# Patient Record
Sex: MALE | Race: BLACK OR AFRICAN AMERICAN | HISPANIC OR LATINO
[De-identification: names, ages, dates, MRNs, and addresses within clinical notes are randomized per-mention and may not be internally consistent; named-entity substitution may affect disease eponyms.]

---

## 2019-03-14 PROBLEM — Z00.00 ENCOUNTER FOR PREVENTIVE HEALTH EXAMINATION: Status: ACTIVE | Noted: 2019-03-14

## 2019-03-25 ENCOUNTER — APPOINTMENT (OUTPATIENT)
Dept: NEUROLOGY | Facility: CLINIC | Age: 79
End: 2019-03-25
Payer: MEDICARE

## 2019-03-25 VITALS
HEIGHT: 71 IN | TEMPERATURE: 97.9 F | HEART RATE: 56 BPM | BODY MASS INDEX: 23.94 KG/M2 | OXYGEN SATURATION: 99 % | SYSTOLIC BLOOD PRESSURE: 133 MMHG | WEIGHT: 171 LBS | DIASTOLIC BLOOD PRESSURE: 83 MMHG

## 2019-03-25 DIAGNOSIS — Z81.8 FAMILY HISTORY OF OTHER MENTAL AND BEHAVIORAL DISORDERS: ICD-10-CM

## 2019-03-25 DIAGNOSIS — R41.3 OTHER AMNESIA: ICD-10-CM

## 2019-03-25 DIAGNOSIS — Z82.3 FAMILY HISTORY OF STROKE: ICD-10-CM

## 2019-03-25 DIAGNOSIS — Z82.0 FAMILY HISTORY OF EPILEPSY AND OTHER DISEASES OF THE NERVOUS SYSTEM: ICD-10-CM

## 2019-03-25 PROCEDURE — 99204 OFFICE O/P NEW MOD 45 MIN: CPT

## 2019-03-25 NOTE — HISTORY OF PRESENT ILLNESS
[FreeTextEntry1] : Pt    presented   accompanied    by his  daughter.   Pt    was    noticed    to  have     disorganized  behavior,   squandering  money,   misplacing    the    things.    Cognitive    issues    were    noticed  a   few    year s  back,  however     getting  progressively   worse.   Pt  lives  by   himself  in  ,  however     based on the   daughters   description his    living   condition is     poor.  He    is  not    able   to  clean    his  house, or   prepare     his   meals.    It  appears     that    he is  not      compliant     with   his  meds.

## 2019-03-25 NOTE — PHYSICAL EXAM
[General Appearance - Alert] : alert [General Appearance - In No Acute Distress] : in no acute distress [General Appearance - Well Developed] : well developed [Oriented To Time, Place, And Person] : oriented to person, place, and time [I: Normal Smell] : smell intact bilaterally [Cranial Nerves Optic (II)] : visual acuity intact bilaterally,  visual fields full to confrontation, pupils equal round and reactive to light [Cranial Nerves Oculomotor (III)] : extraocular motion intact [Cranial Nerves Trigeminal (V)] : facial sensation intact symmetrically [Cranial Nerves Facial (VII)] : face symmetrical [Cranial Nerves Glossopharyngeal (IX)] : tongue and palate midline [Cranial Nerves Accessory (XI - Cranial And Spinal)] : head turning and shoulder shrug symmetric [Cranial Nerves Hypoglossal (XII)] : there was no tongue deviation with protrusion [Motor Tone] : muscle tone was normal in all four extremities [Motor Strength] : muscle strength was normal in all four extremities [Involuntary Movements] : no involuntary movements were seen [Sclera] : the sclera and conjunctiva were normal [Outer Ear] : the ears and nose were normal in appearance [FreeTextEntry1] : Pt    appears   to    be    unaware    of  the     money   squandering  and   his     cognitive    issues [Short Term Intact] : short term memory impaired [Remote Intact] : remote memory impaired [FreeTextEntry6] : 5/5 [FreeTextEntry7] : sensory    is   intact

## 2019-03-25 NOTE — DISCUSSION/SUMMARY
[FreeTextEntry1] : There    is   a    significant    family  h/o   AD\par He    will  need    further    testing\par safety    issues    were    discussed    with    the   patient's    daughter\par

## 2019-03-26 ENCOUNTER — OTHER (OUTPATIENT)
Age: 79
End: 2019-03-26

## 2019-03-27 ENCOUNTER — OTHER (OUTPATIENT)
Age: 79
End: 2019-03-27

## 2019-03-27 LAB
FOLATE SERPL-MCNC: >20 NG/ML
TSH SERPL-ACNC: 6.55 UIU/ML
VIT B12 SERPL-MCNC: 847 PG/ML

## 2019-04-12 ENCOUNTER — APPOINTMENT (OUTPATIENT)
Dept: NEUROLOGY | Facility: CLINIC | Age: 79
End: 2019-04-12
Payer: MEDICARE

## 2019-04-12 ENCOUNTER — APPOINTMENT (OUTPATIENT)
Dept: NEUROLOGY | Facility: CLINIC | Age: 79
End: 2019-04-12

## 2019-04-12 ENCOUNTER — APPOINTMENT (OUTPATIENT)
Dept: MRI IMAGING | Facility: HOSPITAL | Age: 79
End: 2019-04-12
Payer: MEDICARE

## 2019-04-12 ENCOUNTER — OUTPATIENT (OUTPATIENT)
Dept: OUTPATIENT SERVICES | Facility: HOSPITAL | Age: 79
LOS: 1 days | End: 2019-04-12
Payer: MEDICARE

## 2019-04-12 PROCEDURE — 70551 MRI BRAIN STEM W/O DYE: CPT

## 2019-04-12 PROCEDURE — 70551 MRI BRAIN STEM W/O DYE: CPT | Mod: 26

## 2019-04-12 PROCEDURE — 95819 EEG AWAKE AND ASLEEP: CPT

## 2019-05-10 ENCOUNTER — LABORATORY RESULT (OUTPATIENT)
Age: 79
End: 2019-05-10

## 2019-05-10 ENCOUNTER — APPOINTMENT (OUTPATIENT)
Dept: NEUROLOGY | Facility: CLINIC | Age: 79
End: 2019-05-10
Payer: MEDICARE

## 2019-05-10 VITALS
HEART RATE: 60 BPM | HEIGHT: 71 IN | SYSTOLIC BLOOD PRESSURE: 133 MMHG | OXYGEN SATURATION: 99 % | DIASTOLIC BLOOD PRESSURE: 86 MMHG | TEMPERATURE: 97.9 F

## 2019-05-10 DIAGNOSIS — R94.6 ABNORMAL RESULTS OF THYROID FUNCTION STUDIES: ICD-10-CM

## 2019-05-10 PROCEDURE — 99215 OFFICE O/P EST HI 40 MIN: CPT

## 2019-05-10 RX ORDER — QUETIAPINE FUMARATE 25 MG/1
25 TABLET ORAL
Qty: 30 | Refills: 5 | Status: ACTIVE | COMMUNITY
Start: 2019-03-25 | End: 1900-01-01

## 2019-05-10 NOTE — HISTORY OF PRESENT ILLNESS
[FreeTextEntry1] : Pt   presented   to   follow   up  test  results    and    further    management. 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 No  evidence    of   acute    infarction.   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 No   hydrocephalus.   Punctate    focus    of   gradient     echo    hypointensity    in the   right    frontal  periventricular  white   matter   that    may    represent     calcification   or    hemosiderin .\par \par Pt     denies  any    issues.   Neuropsychological  evaluation  is    not   done   yet. \par TSH is     slightly   elevated\par Labs     for    reversible  causes    of   dementia   are   normal.

## 2019-05-10 NOTE — PHYSICAL EXAM
[General Appearance - Alert] : alert [Oriented To Time, Place, And Person] : oriented to person, place, and time [General Appearance - In No Acute Distress] : in no acute distress [General Appearance - Well Developed] : well developed [Person] : oriented to person [Place] : oriented to place [Time] : oriented to time [Naming Objects] : no difficulty naming common objects [Fluency] : fluency intact [Repeating Phrases] : no difficulty repeating a phrase [Past History] : adequate knowledge of personal past history [I: Normal Smell] : smell intact bilaterally [Cranial Nerves Optic (II)] : visual acuity intact bilaterally,  visual fields full to confrontation, pupils equal round and reactive to light [Cranial Nerves Facial (VII)] : face symmetrical [Cranial Nerves Oculomotor (III)] : extraocular motion intact [Cranial Nerves Trigeminal (V)] : facial sensation intact symmetrically [Cranial Nerves Vestibulocochlear (VIII)] : hearing was intact bilaterally [Cranial Nerves Glossopharyngeal (IX)] : tongue and palate midline [Cranial Nerves Accessory (XI - Cranial And Spinal)] : head turning and shoulder shrug symmetric [Cranial Nerves Hypoglossal (XII)] : there was no tongue deviation with protrusion [Motor Tone] : muscle tone was normal in all four extremities [Motor Strength] : muscle strength was normal in all four extremities [No Muscle Atrophy] : normal bulk in all four extremities [Involuntary Movements] : no involuntary movements were seen [Abnormal Walk] : normal gait [Balance] : balance was intact [Sensation Tactile Decrease] : light touch was intact [2+] : Ankle jerk left 2+ [Sclera] : the sclera and conjunctiva were normal [FreeTextEntry1] : Pt    appears   to    be    unaware    of  the     money   squandering  and   his     cognitive    issues [Span Intact] : the attention span was decreased [Registration Intact] : recent registration memory impaired [Concentration Intact] : a decrease in concentrating ability was observed [Visual Intact] : visual attention was ~T ~L decreased [Writing A Sentence] : difficulty writing a sentence [Reading] : difficulty reading [Comprehension] : comprehension not intact [Vocabulary] : inadequate range of vocabulary [Plantar Reflex Right Only] : normal on the right [Tremor] : no tremor present [Past-pointing] : there was no past-pointing [Plantar Reflex Left Only] : normal on the left [FreeTextEntry6] : 5/5 [FreeTextEntry7] : sensory    is   intact

## 2019-05-10 NOTE — DISCUSSION/SUMMARY
[FreeTextEntry1] : Likely     vascular   dementia\par AD    is  a  possibility,  he   will    need    Neuropsychological    evaluation\par Abn  TSH,   will  repeat\par Incidental    finding on  MRI -   no  intervention     at this  point,   will    repeat   an  MRI    in   6  months

## 2019-05-10 NOTE — REVIEW OF SYSTEMS
[As Noted in HPI] : as noted in HPI [FreeTextEntry2] : Patient   can  not   present    due  to    the     memory   issues

## 2019-05-11 DIAGNOSIS — E03.9 HYPOTHYROIDISM, UNSPECIFIED: ICD-10-CM

## 2019-05-13 PROBLEM — E03.9 HYPOTHYROIDISM, UNSPECIFIED TYPE: Status: ACTIVE | Noted: 2019-05-13

## 2019-05-17 LAB — TSH SERPL-ACNC: 11 UIU/ML

## 2019-06-25 ENCOUNTER — APPOINTMENT (OUTPATIENT)
Dept: NEUROLOGY | Facility: CLINIC | Age: 79
End: 2019-06-25
Payer: MEDICARE

## 2019-06-25 PROCEDURE — 96116 NUBHVL XM PHYS/QHP 1ST HR: CPT

## 2019-06-25 PROCEDURE — 96139 PSYCL/NRPSYC TST TECH EA: CPT

## 2019-06-25 PROCEDURE — 96132 NRPSYC TST EVAL PHYS/QHP 1ST: CPT

## 2019-06-25 PROCEDURE — 96133 NRPSYC TST EVAL PHYS/QHP EA: CPT

## 2019-06-25 PROCEDURE — 96138 PSYCL/NRPSYC TECH 1ST: CPT

## 2019-06-28 ENCOUNTER — APPOINTMENT (OUTPATIENT)
Dept: NEUROLOGY | Facility: CLINIC | Age: 79
End: 2019-06-28